# Patient Record
Sex: MALE | Race: WHITE | NOT HISPANIC OR LATINO | Employment: STUDENT | URBAN - METROPOLITAN AREA
[De-identification: names, ages, dates, MRNs, and addresses within clinical notes are randomized per-mention and may not be internally consistent; named-entity substitution may affect disease eponyms.]

---

## 2017-04-24 ENCOUNTER — LAB SERVICES (OUTPATIENT)
Dept: LAB | Age: 27
End: 2017-04-24

## 2017-04-24 ENCOUNTER — OFFICE VISIT (OUTPATIENT)
Dept: GASTROENTEROLOGY | Age: 27
End: 2017-04-24

## 2017-04-24 VITALS
HEART RATE: 88 BPM | DIASTOLIC BLOOD PRESSURE: 76 MMHG | SYSTOLIC BLOOD PRESSURE: 104 MMHG | BODY MASS INDEX: 24.78 KG/M2 | WEIGHT: 163 LBS

## 2017-04-24 DIAGNOSIS — K50.00 CROHN'S ILEITIS, WITHOUT COMPLICATIONS (CMD): Primary | ICD-10-CM

## 2017-04-24 DIAGNOSIS — K50.00 CROHN'S ILEITIS, WITHOUT COMPLICATIONS (CMD): ICD-10-CM

## 2017-04-24 LAB
BASOPHILS # BLD AUTO: 0 K/MCL (ref 0–0.3)
BASOPHILS NFR BLD AUTO: 1 %
DIFFERENTIAL METHOD BLD: ABNORMAL
EOSINOPHIL # BLD AUTO: 0.1 K/MCL (ref 0.1–0.5)
EOSINOPHIL NFR SPEC: 2 %
ERYTHROCYTE [DISTWIDTH] IN BLOOD: 12.8 % (ref 11–15)
HCT VFR BLD CALC: 43 % (ref 39–51)
HGB BLD-MCNC: 15.2 G/DL (ref 13–17)
LYMPHOCYTES # BLD MANUAL: 1.7 K/MCL (ref 1–4.8)
LYMPHOCYTES NFR BLD MANUAL: 45 %
MCH RBC QN AUTO: 32.2 PG (ref 26–34)
MCHC RBC AUTO-ENTMCNC: 35.3 G/DL (ref 32–36.5)
MCV RBC AUTO: 91.1 FL (ref 78–100)
MONOCYTES # BLD MANUAL: 0.4 K/MCL (ref 0.3–0.9)
MONOCYTES NFR BLD MANUAL: 11 %
NEUTROPHILS # BLD AUTO: 1.6 K/MCL (ref 1.8–7.7)
NEUTROPHILS NFR BLD AUTO: 41 %
PLATELET # BLD: 204 K/MCL (ref 140–450)
RBC # BLD: 4.72 MIL/MCL (ref 4.5–5.9)
WBC # BLD: 3.8 K/MCL (ref 4.2–11)

## 2017-04-24 PROCEDURE — 99214 OFFICE O/P EST MOD 30 MIN: CPT | Performed by: INTERNAL MEDICINE

## 2017-04-24 PROCEDURE — 36415 COLL VENOUS BLD VENIPUNCTURE: CPT | Performed by: INTERNAL MEDICINE

## 2017-04-24 RX ORDER — POLYETHYLENE GLYCOL 3350 17 G/17G
17 POWDER, FOR SOLUTION ORAL ONCE
Qty: 255 G | Refills: 0 | Status: SHIPPED | OUTPATIENT
Start: 2017-04-24 | End: 2017-04-24 | Stop reason: CLARIF

## 2017-04-24 RX ORDER — POLYETHYLENE GLYCOL 3350 17 G/17G
238 POWDER, FOR SOLUTION ORAL DAILY
Qty: 476 G | Refills: 0 | Status: SHIPPED | OUTPATIENT
Start: 2017-04-24

## 2017-04-24 RX ORDER — MERCAPTOPURINE 50 MG/1
50 TABLET ORAL DAILY
Qty: 30 TABLET | Refills: 1 | Status: SHIPPED | OUTPATIENT
Start: 2017-04-24 | End: 2017-07-19 | Stop reason: SDUPTHER

## 2017-04-25 ENCOUNTER — PREP FOR CASE (OUTPATIENT)
Dept: GASTROENTEROLOGY | Age: 27
End: 2017-04-25

## 2017-04-25 ENCOUNTER — NURSE TRIAGE (OUTPATIENT)
Dept: TELEHEALTH | Age: 27
End: 2017-04-25

## 2017-04-25 ENCOUNTER — TELEPHONE (OUTPATIENT)
Dept: GASTROENTEROLOGY | Age: 27
End: 2017-04-25

## 2017-04-25 DIAGNOSIS — R19.7 DIARRHEA, UNSPECIFIED TYPE: Primary | ICD-10-CM

## 2017-04-25 LAB
FOLATE SERPL-MCNC: 11.1 NG/ML
IRON SATN MFR SERPL: 44 % (ref 15–45)
IRON SERPL-MCNC: 137 MCG/DL (ref 65–175)
MYELOPEROXIDASE AB SER-ACNC: <0.2 AI (ref 0–0.9)
PROTEINASE3 AB SER-ACNC: <0.2 AI (ref 0–0.9)
TIBC SERPL-MCNC: 311 MCG/DL (ref 250–450)
VIT B12 SERPL-MCNC: 247 PG/ML (ref 211–911)

## 2017-04-27 ENCOUNTER — TELEPHONE (OUTPATIENT)
Dept: GASTROENTEROLOGY | Age: 27
End: 2017-04-27

## 2017-07-19 ENCOUNTER — TELEPHONE (OUTPATIENT)
Dept: GASTROENTEROLOGY | Age: 27
End: 2017-07-19

## 2017-07-19 RX ORDER — MERCAPTOPURINE 50 MG/1
50 TABLET ORAL DAILY
Qty: 30 TABLET | Refills: 11 | Status: SHIPPED | OUTPATIENT
Start: 2017-07-19 | End: 2019-08-05 | Stop reason: SDUPTHER

## 2018-07-05 ENCOUNTER — APPOINTMENT (OUTPATIENT)
Dept: LAB | Age: 28
End: 2018-07-05

## 2018-07-05 LAB
BASOPHILS # BLD AUTO: 0 K/MCL (ref 0–0.3)
BASOPHILS NFR BLD AUTO: 0 %
DIFFERENTIAL METHOD BLD: NORMAL
EOSINOPHIL # BLD AUTO: 0.1 K/MCL (ref 0.1–0.5)
EOSINOPHIL NFR SPEC: 2 %
ERYTHROCYTE [DISTWIDTH] IN BLOOD: 13.5 % (ref 11–15)
HCT VFR BLD CALC: 47.4 % (ref 39–51)
HGB BLD-MCNC: 16.8 G/DL (ref 13–17)
LYMPHOCYTES # BLD MANUAL: 2.2 K/MCL (ref 1–4.8)
LYMPHOCYTES NFR BLD MANUAL: 41 %
MCH RBC QN AUTO: 32.8 PG (ref 26–34)
MCHC RBC AUTO-ENTMCNC: 35.4 G/DL (ref 32–36.5)
MCV RBC AUTO: 92.6 FL (ref 78–100)
MONOCYTES # BLD MANUAL: 0.7 K/MCL (ref 0.3–0.9)
MONOCYTES NFR BLD MANUAL: 13 %
NEUTROPHILS # BLD: 2.4 K/MCL (ref 1.8–7.7)
NEUTROPHILS NFR BLD AUTO: 44 %
PLATELET # BLD: 206 K/MCL (ref 140–450)
RBC # BLD: 5.12 MIL/MCL (ref 4.5–5.9)
WBC # BLD: 5.4 K/MCL (ref 4.2–11)

## 2018-07-06 LAB
25(OH)D3+25(OH)D2 SERPL-MCNC: 24.6 NG/ML (ref 30–100)
CRP SERPL HS-MCNC: 1.02 MG/L
ESTRADIOL SERPL-MCNC: 41 PG/ML
VIT B12 SERPL-MCNC: >2000 PG/ML (ref 211–911)

## 2018-07-07 LAB
SHBG SERPL-SCNC: 24 NMOL/L (ref 11–80)
TESTOST FREE MFR SERPL: 2 % (ref 1.6–2.9)
TESTOST FREE SERPL-MCNC: 42 PG/ML (ref 47–244)
TESTOST SERPL-MCNC: 207 NG/DL (ref 300–1080)

## 2019-08-05 ENCOUNTER — TELEPHONE (OUTPATIENT)
Dept: GASTROENTEROLOGY | Age: 29
End: 2019-08-05

## 2019-08-05 RX ORDER — MERCAPTOPURINE 50 MG/1
50 TABLET ORAL DAILY
Qty: 90 TABLET | Refills: 4 | Status: SHIPPED | OUTPATIENT
Start: 2019-08-05 | End: 2020-11-13 | Stop reason: SDUPTHER

## 2020-03-30 ENCOUNTER — TELEPHONE (OUTPATIENT)
Dept: GASTROENTEROLOGY | Age: 30
End: 2020-03-30

## 2020-03-31 ENCOUNTER — E-ADVICE (OUTPATIENT)
Dept: GASTROENTEROLOGY | Age: 30
End: 2020-03-31

## 2020-04-03 ENCOUNTER — TELEPHONE (OUTPATIENT)
Dept: GASTROENTEROLOGY | Age: 30
End: 2020-04-03

## 2020-04-06 ENCOUNTER — V-VISIT (OUTPATIENT)
Dept: GASTROENTEROLOGY | Age: 30
End: 2020-04-06

## 2020-04-06 ENCOUNTER — E-ADVICE (OUTPATIENT)
Dept: GASTROENTEROLOGY | Age: 30
End: 2020-04-06

## 2020-04-06 DIAGNOSIS — K50.00 CROHN'S DISEASE OF SMALL INTESTINE WITHOUT COMPLICATION (CMD): Primary | ICD-10-CM

## 2020-04-06 PROCEDURE — 99214 OFFICE O/P EST MOD 30 MIN: CPT | Performed by: INTERNAL MEDICINE

## 2020-11-13 ENCOUNTER — TELEPHONE (OUTPATIENT)
Dept: GASTROENTEROLOGY | Age: 30
End: 2020-11-13

## 2020-11-13 RX ORDER — MERCAPTOPURINE 50 MG/1
50 TABLET ORAL DAILY
Qty: 90 TABLET | Refills: 4 | Status: SHIPPED | OUTPATIENT
Start: 2020-11-13 | End: 2021-12-09 | Stop reason: SDUPTHER

## 2021-12-04 RX ORDER — MERCAPTOPURINE 50 MG/1
50 TABLET ORAL DAILY
Qty: 90 TABLET | Refills: 4 | OUTPATIENT
Start: 2021-12-04

## 2021-12-06 ENCOUNTER — TELEPHONE (OUTPATIENT)
Dept: GASTROENTEROLOGY | Age: 31
End: 2021-12-06

## 2021-12-09 ENCOUNTER — V-VISIT (OUTPATIENT)
Dept: GASTROENTEROLOGY | Age: 31
End: 2021-12-09

## 2021-12-09 DIAGNOSIS — K50.00 CROHN'S DISEASE OF SMALL INTESTINE WITHOUT COMPLICATION (CMD): Primary | ICD-10-CM

## 2021-12-09 PROCEDURE — 99214 OFFICE O/P EST MOD 30 MIN: CPT | Performed by: INTERNAL MEDICINE

## 2021-12-09 RX ORDER — MERCAPTOPURINE 50 MG/1
50 TABLET ORAL DAILY
Qty: 90 TABLET | Refills: 3 | Status: SHIPPED | OUTPATIENT
Start: 2021-12-09

## 2022-11-29 ENCOUNTER — OFFICE VISIT (OUTPATIENT)
Dept: URBAN - METROPOLITAN AREA CLINIC 66 | Facility: CLINIC | Age: 32
End: 2022-11-29

## 2022-11-29 RX ORDER — SOD SULF/POT CHLORIDE/MAG SULF 1.479 G
AS DIRECTED TABLET ORAL ONCE
Qty: 1 PACKET | Refills: 0 | OUTPATIENT
Start: 2022-11-29

## 2022-11-29 RX ORDER — MERCAPTOPURINE 50 MG/1
TAKE ONE TABLET BY MOUTH ONE TIME DAILY TABLET ORAL
Qty: 90 UNSPECIFIED | Refills: 0 | Status: ACTIVE | COMMUNITY

## 2022-11-29 NOTE — HPI-MIGRATED HPI
Transition of Care : Patient evaluated due to Crohn's disease.  Referred was diagnosed on 2003 when developed episodes of abdominal pain. Referred required small bowel resection. Referred that was treated with Remicade without improvement. Currently on 6MP since 2004. Currently feeling good.  Denies nausea, vomits, dysphagia, odynophagia, heartburn, abdominal pain, diarrhea, constipation GI bleeding or weight loss

## 2022-11-29 NOTE — EXAM-MIGRATED EXAMINATIONS
General Examination: General appearance: -> alert, pleasant, well-nourished and in no acute distress   Head: -> normocephalic, atraumatic   Eyes: -> pupils equal, round, reactive to light and accommodation, sclera anicteric   Ears: -> normal   Oral cavity: -> mucosa moist   Neck / thyroid: ->    Rectal: -> not examined    Extremities: -> normal extremity with no clubbing, cyanosis or edema   Neurologic: -> alert and oriented, normal exam with no motor or sensory deficits   Lymph nodes: ->    Skin: -> skin is warm and dry, with no rashes, good skin turgor and normal hair distribution, with no suspicious skin lesions   Heart: -> regular rate and rhythm without murmurs, gallops, clicks or rubs   Lungs: -> clear to auscultation bilaterally, with good air movement and no rales, rhonchi or wheezes   Breasts: ->    Abdomen: -> soft with good bowel sounds, nontender, and no masses or hepatosplenomegaly      
PCP

## 2022-12-02 ENCOUNTER — TELEPHONE ENCOUNTER (OUTPATIENT)
Dept: URBAN - METROPOLITAN AREA CLINIC 68 | Facility: CLINIC | Age: 32
End: 2022-12-02

## 2022-12-02 LAB
ALBUMIN/GLOBULIN RATIO: 1.5
ALBUMIN: 4.4
ALKALINE PHOSPHATASE: 61
ALT: 60
AST: 25
BILIRUBIN, TOTAL: 0.7
BUN/CREATININE RATIO: (no result)
C-REACTIVE PROTEIN: 1.9
CALCIUM: 9.3
CARBON DIOXIDE: 28
CHLORIDE: 105
CREATININE: 1.05
EGFR: 96
GLOBULIN: 2.9
GLUCOSE: 97
HEMATOCRIT: 46.2
HEMOGLOBIN: 16.2
MCH: 33.5
MCHC: 35.1
MCV: 95.5
MPV: 10.3
PLATELET COUNT: 209
POTASSIUM: 4.3
PROTEIN, TOTAL: 7.3
RDW: 13.8
RED BLOOD CELL COUNT: 4.84
SODIUM: 141
UREA NITROGEN (BUN): 14
WHITE BLOOD CELL COUNT: 5.7

## 2022-12-02 RX ORDER — MERCAPTOPURINE 50 MG/1
TAKE ONE TABLET BY MOUTH ONE TIME DAILY TABLET ORAL
Qty: 90 UNSPECIFIED | Refills: 0

## 2022-12-04 ENCOUNTER — TELEPHONE ENCOUNTER (OUTPATIENT)
Dept: URBAN - METROPOLITAN AREA CLINIC 68 | Facility: CLINIC | Age: 32
End: 2022-12-04

## 2022-12-04 RX ORDER — MERCAPTOPURINE 50 MG/1
ONE TABLET TABLET ORAL ONCE A DAY
Qty: 90 TABLETS | Refills: 1

## 2022-12-08 ENCOUNTER — OFFICE VISIT (OUTPATIENT)
Dept: URBAN - METROPOLITAN AREA SURGERY CENTER 12 | Facility: SURGERY CENTER | Age: 32
End: 2022-12-08

## 2023-06-01 ENCOUNTER — TELEPHONE ENCOUNTER (OUTPATIENT)
Dept: URBAN - METROPOLITAN AREA CLINIC 68 | Facility: CLINIC | Age: 33
End: 2023-06-01

## 2023-06-01 RX ORDER — MERCAPTOPURINE 50 MG/1
ONE TABLET TABLET ORAL ONCE A DAY
Qty: 90 TABLETS | Refills: 1

## 2023-11-30 ENCOUNTER — OFFICE VISIT (OUTPATIENT)
Dept: URBAN - METROPOLITAN AREA TELEHEALTH 1 | Facility: TELEHEALTH | Age: 33
End: 2023-11-30
Payer: COMMERCIAL

## 2023-11-30 ENCOUNTER — TELEPHONE ENCOUNTER (OUTPATIENT)
Dept: URBAN - METROPOLITAN AREA CLINIC 68 | Facility: CLINIC | Age: 33
End: 2023-11-30

## 2023-11-30 VITALS — WEIGHT: 210 LBS | BODY MASS INDEX: 31.93 KG/M2

## 2023-11-30 DIAGNOSIS — K50.90 CROHN DISEASE: ICD-10-CM

## 2023-11-30 DIAGNOSIS — R74.8 ELEVATED LIVER ENZYMES: ICD-10-CM

## 2023-11-30 PROCEDURE — 99214 OFFICE O/P EST MOD 30 MIN: CPT

## 2023-11-30 RX ORDER — SOD SULF/POT CHLORIDE/MAG SULF 1.479 G
AS DIRECTED TABLET ORAL ONCE
Qty: 1 PACKET | Refills: 0 | Status: ACTIVE | COMMUNITY
Start: 2022-11-29

## 2023-11-30 RX ORDER — MERCAPTOPURINE 50 MG/1
ONE TABLET TABLET ORAL ONCE A DAY
Qty: 90 | Refills: 3

## 2023-11-30 RX ORDER — MERCAPTOPURINE 50 MG/1
ONE TABLET TABLET ORAL ONCE A DAY
Qty: 90 TABLETS | Refills: 1 | Status: ACTIVE | COMMUNITY

## 2023-11-30 NOTE — HPI-TODAY'S VISIT:
Patient evaluated via telehealth due to Crohn's disease. Referred was diagnosed in 2003 when developed episodes of abdominal pain. Referred required small bowel resection. Referred that was treated with Remicade without improvement. Currently on 6MP since 2004. Currently feeling good. Refers was scheduled for colonoscopy last year however had to cancel do to insurance issues. Denies nausea, vomits, dysphagia, odynophagia, heartburn, abdominal pain, diarrhea, constipation GI bleeding or weight loss.

## 2023-12-05 ENCOUNTER — WEB ENCOUNTER (OUTPATIENT)
Dept: URBAN - METROPOLITAN AREA CLINIC 68 | Facility: CLINIC | Age: 33
End: 2023-12-05

## 2023-12-12 LAB
A/G RATIO: 1.6
ALBUMIN: 4.4
ALKALINE PHOSPHATASE: 49
ALT (SGPT): 20
AST (SGOT): 16
BILIRUBIN, TOTAL: 0.8
BUN/CREATININE RATIO: (no result)
BUN: 14
C-REACTIVE PROTEIN, QUANT: 0.5
CALCIUM: 9.7
CARBON DIOXIDE, TOTAL: 28
CHLORIDE: 106
CREATININE: 0.91
EGFR: 85
GLOBULIN, TOTAL: 2.8
GLUCOSE: 91
HEMATOCRIT: 44.8
HEMOGLOBIN: 15.6
MCH: 32.8
MCHC: 34.8
MCV: 94.3
MPV: 10.8
PLATELET COUNT: 208
POTASSIUM: 4.3
PROTEIN, TOTAL: 7.2
RDW: 13
RED BLOOD CELL COUNT: 4.75
SODIUM: 140
WHITE BLOOD CELL COUNT: 4.1

## 2023-12-13 ENCOUNTER — DASHBOARD ENCOUNTERS (OUTPATIENT)
Age: 33
End: 2023-12-13

## 2024-11-20 ENCOUNTER — TELEPHONE ENCOUNTER (OUTPATIENT)
Dept: URBAN - METROPOLITAN AREA CLINIC 68 | Facility: CLINIC | Age: 34
End: 2024-11-20

## 2024-11-20 RX ORDER — MERCAPTOPURINE 50 MG/1
ONE TABLET TABLET ORAL ONCE A DAY
Qty: 90 TABLETS | Refills: 0